# Patient Record
(demographics unavailable — no encounter records)

---
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
## 2017-10-02 NOTE — RAD
THREE VIEWS RIGHT ANKLE:

 

Date: 10-2-17

 

History: ORIF right ankle. 

 

Comparison: 10-2-17 at 0755 hours. 

 

FINDINGS: 

There has been interval post-surgical changes with a medial plate and multiple screws transfixing a 
previously seen comminuted spiral type fracture of the distal tibial. There is improvement in alignm
ent of the fracture fragments. No hardware complication is seen. The fracture of the lateral malleol
us is less well delineated on the fluoroscopic evaluation. 

 

IMPRESSION: 

Internal fixation of comminuted spiral type fracture distal right tibia. 

 

POS: [facility]
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
## 2017-10-02 NOTE — RAD
RIGHT ANKLE 3 VIEWS:

 

HISTORY: 

Fall.  Right ankle injury.

 

FINDINGS: 

Comminuted fracture of the distal tibia includes a spiral component of the distal metaphysis with 
 shaft width lateral displacement of the major distal fragment and apex lateral angulation.  Intraa
rticular component to the medial margin of the tibial plafond is apparent with 0.2 cm gap and no sig
nificant step-off.  Ankle mortise is otherwise intact.  Osteophytosis is present throughout the ankl
e and hindfoot.

 

Minimally displaced oblique fracture involves the distal fibula at the level of the ankle mortise.

 

IMPRESSION: 

Comminuted distal tibial and fibular fractures as detailed above.

 

POS: [facility]

## 2017-10-03 NOTE — DIS
DATE OF ADMISSION:  10/02/2017

 

DATE OF DISCHARGE:  10/03/2017

 

PREOPERATIVE DIAGNOSIS:  Right distal tibial metadiaphyseal displaced fracture.

 

DISCHARGE DIAGNOSIS:  Right distal tibial metadiaphyseal displaced fracture.

 

PROCEDURE:  The patient underwent an open reduction and internal fixation of right lower extremity.

 

HOSPITAL COURSE:  Hospital stay was unremarkable.  The patient had no pain overnight and his block i
s still in place.  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  He has been instructed on physical therapy
, crutch training, and walker training.  We have written him a script for Tylenol #3 and a knee scoo
ter.  He had no postoperative complications.

 

FOLLOWUP:  Followup would be in 10 days, sooner if there are problems and/or concerns.  He has been 
instructed to call the clinic if he has any questions or concerns, again Tylenol #3 given with instr
uctions.

 

This is Oerstes Chance PA-C dictating for Piter Bingham M.D.
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
## 2017-10-05 NOTE — EKG
Test Reason : PREOP

Blood Pressure : ***/*** mmHG

Vent. Rate : 053 BPM     Atrial Rate : 053 BPM

   P-R Int : 146 ms          QRS Dur : 070 ms

    QT Int : 488 ms       P-R-T Axes : 028 -14 031 degrees

   QTc Int : 457 ms

 

Sinus bradycardia

Otherwise normal ECG

When compared with ECG of 19-DEC-2005 16:53,

No significant change was found

Confirmed by BHARGAV DEL VALLE ([id number]) on 10/5/2017 6:45:42 AM

 

Referred By:  INDIA           Confirmed By:BHARGAV DEL VALLE

## 2018-03-19 NOTE — CT
CT LUMBAR SPINE WITHOUT CONTRAST:

 

Date:  03/19/18 

 

HISTORY:  

Trauma. Fall from ladder 2 days ago. Post-traumatic pain. 

 

COMPARISON:  

None. 

 

TECHNIQUE:  

CT lumbar spine is performed without contrast. Reformatted images are submitted for interpretation. 

 

FINDINGS:

Visualized alimentary canal and solid organs are unremarkable. Visualized aorta is unremarkable. Symm
etric attenuation of the psoas muscles. 

 

Limited evaluation of the contents of the central spinal canal and neural foramina due to technique. 


 

Mild compression fractures involving L1 and L4. There is minimal paraspinal hematoma. Minimal retropu
lsion. 

 

T11-T12 and T12-L1: 

No high grade central canal stenosis or high grade neural foraminal narrowing. 

 

L1-L2:

No high grade central canal stenosis or high grade foraminal narrowing. 

 

L2-L3: 

Generalized disc bulge, ligamentum flavum thickening, and facet hypertrophy result in mild central ca
nal stenosis. Neural foramina are patent bilaterally. 

 

 

L3-L4: 

Generalized disc bulge, ligamentum flavum thickening, and facet hypertrophy result in moderate centra
l canal stenosis. Right neural foramen is patent. Minimal left foraminal narrowing. 

 

L4-L5:

Generalized disc bulge, ligamentum flavum thickening, and facet hypertrophy result in severe central 
canal stenosis. Mild bilateral foraminal narrowing. 

 

L5-S1:

Generalized disc bulge, ligament flavum thickening, and facet hypertrophy result in mild central beckie
l stenosis. Narrowing in both subarticular zones with partial obscuration of bilateral traversing S1 
nerve roots. Mild bilateral foraminal narrowing. 

 

IMPRESSION: 

1.  Acute mild compression fractures at L1 and L4. 

 

2.  Degenerative changes of the lumbar spine as above. Significant central canal stenosis at the L4-L
5 level. 

 

 

POS: [facility]

## 2018-03-19 NOTE — RAD
THREE VIEWS LUMBOSACRAL SPINE:

 

HISTORY: 

Fall from an 8 foot ladder with low back pain.

 

FINDINGS: 

Three views lumbosacral spine show compression deformity of the L1 vertebral body with approximately 
10% height loss.  The age of this fracture is indeterminate and may be acute or chronic.  There are m
ild to moderate degenerative changes throughout the lumbar spine, greatest at L5-S1.  Posterior facet
 arthrosis is seen throughout the lumbar spine.

 

IMPRESSION: 

Wedge compression deformity of L1 may be acute or chronic.  A CT of the lumbar spine is recommended f
or further evaluation.

 

POS: [facility]